# Patient Record
Sex: MALE | Race: WHITE | HISPANIC OR LATINO | URBAN - METROPOLITAN AREA
[De-identification: names, ages, dates, MRNs, and addresses within clinical notes are randomized per-mention and may not be internally consistent; named-entity substitution may affect disease eponyms.]

---

## 2017-03-08 PROBLEM — Z00.00 ENCOUNTER FOR PREVENTIVE HEALTH EXAMINATION: Status: ACTIVE | Noted: 2017-03-08

## 2021-05-08 ENCOUNTER — EMERGENCY (EMERGENCY)
Facility: HOSPITAL | Age: 61
LOS: 1 days | Discharge: HOME | End: 2021-05-08
Attending: EMERGENCY MEDICINE
Payer: COMMERCIAL

## 2021-05-08 VITALS
OXYGEN SATURATION: 97 % | HEART RATE: 66 BPM | RESPIRATION RATE: 18 BRPM | SYSTOLIC BLOOD PRESSURE: 140 MMHG | TEMPERATURE: 98 F | DIASTOLIC BLOOD PRESSURE: 80 MMHG

## 2021-05-08 VITALS
SYSTOLIC BLOOD PRESSURE: 152 MMHG | TEMPERATURE: 98 F | OXYGEN SATURATION: 96 % | WEIGHT: 154.98 LBS | RESPIRATION RATE: 18 BRPM | HEART RATE: 74 BPM | HEIGHT: 66 IN | DIASTOLIC BLOOD PRESSURE: 90 MMHG

## 2021-05-08 LAB
ALBUMIN SERPL ELPH-MCNC: 3.9 G/DL — SIGNIFICANT CHANGE UP (ref 3.3–5.2)
ALP SERPL-CCNC: 59 U/L — SIGNIFICANT CHANGE UP (ref 40–120)
ALT FLD-CCNC: 19 U/L — SIGNIFICANT CHANGE UP
ANION GAP SERPL CALC-SCNC: 12 MMOL/L — SIGNIFICANT CHANGE UP (ref 5–17)
AST SERPL-CCNC: 26 U/L — SIGNIFICANT CHANGE UP
BASOPHILS # BLD AUTO: 0.07 K/UL — SIGNIFICANT CHANGE UP (ref 0–0.2)
BASOPHILS NFR BLD AUTO: 1.1 % — SIGNIFICANT CHANGE UP (ref 0–2)
BILIRUB SERPL-MCNC: 0.3 MG/DL — LOW (ref 0.4–2)
BUN SERPL-MCNC: 16 MG/DL — SIGNIFICANT CHANGE UP (ref 8–20)
CALCIUM SERPL-MCNC: 8.9 MG/DL — SIGNIFICANT CHANGE UP (ref 8.6–10.2)
CHLORIDE SERPL-SCNC: 105 MMOL/L — SIGNIFICANT CHANGE UP (ref 98–107)
CO2 SERPL-SCNC: 23 MMOL/L — SIGNIFICANT CHANGE UP (ref 22–29)
CREAT SERPL-MCNC: 0.86 MG/DL — SIGNIFICANT CHANGE UP (ref 0.5–1.3)
EOSINOPHIL # BLD AUTO: 0.26 K/UL — SIGNIFICANT CHANGE UP (ref 0–0.5)
EOSINOPHIL NFR BLD AUTO: 4 % — SIGNIFICANT CHANGE UP (ref 0–6)
GLUCOSE SERPL-MCNC: 97 MG/DL — SIGNIFICANT CHANGE UP (ref 70–99)
HCT VFR BLD CALC: 38.3 % — LOW (ref 39–50)
HGB BLD-MCNC: 12 G/DL — LOW (ref 13–17)
IMM GRANULOCYTES NFR BLD AUTO: 1.1 % — SIGNIFICANT CHANGE UP (ref 0–1.5)
LYMPHOCYTES # BLD AUTO: 1.07 K/UL — SIGNIFICANT CHANGE UP (ref 1–3.3)
LYMPHOCYTES # BLD AUTO: 16.3 % — SIGNIFICANT CHANGE UP (ref 13–44)
MCHC RBC-ENTMCNC: 27 PG — SIGNIFICANT CHANGE UP (ref 27–34)
MCHC RBC-ENTMCNC: 31.3 GM/DL — LOW (ref 32–36)
MCV RBC AUTO: 86.1 FL — SIGNIFICANT CHANGE UP (ref 80–100)
MONOCYTES # BLD AUTO: 0.58 K/UL — SIGNIFICANT CHANGE UP (ref 0–0.9)
MONOCYTES NFR BLD AUTO: 8.9 % — SIGNIFICANT CHANGE UP (ref 2–14)
NEUTROPHILS # BLD AUTO: 4.5 K/UL — SIGNIFICANT CHANGE UP (ref 1.8–7.4)
NEUTROPHILS NFR BLD AUTO: 68.6 % — SIGNIFICANT CHANGE UP (ref 43–77)
PLATELET # BLD AUTO: 210 K/UL — SIGNIFICANT CHANGE UP (ref 150–400)
POTASSIUM SERPL-MCNC: 4 MMOL/L — SIGNIFICANT CHANGE UP (ref 3.5–5.3)
POTASSIUM SERPL-SCNC: 4 MMOL/L — SIGNIFICANT CHANGE UP (ref 3.5–5.3)
PROT SERPL-MCNC: 6.3 G/DL — LOW (ref 6.6–8.7)
RBC # BLD: 4.45 M/UL — SIGNIFICANT CHANGE UP (ref 4.2–5.8)
RBC # FLD: 11.8 % — SIGNIFICANT CHANGE UP (ref 10.3–14.5)
SODIUM SERPL-SCNC: 140 MMOL/L — SIGNIFICANT CHANGE UP (ref 135–145)
WBC # BLD: 6.55 K/UL — SIGNIFICANT CHANGE UP (ref 3.8–10.5)
WBC # FLD AUTO: 6.55 K/UL — SIGNIFICANT CHANGE UP (ref 3.8–10.5)

## 2021-05-08 PROCEDURE — 70450 CT HEAD/BRAIN W/O DYE: CPT | Mod: 26

## 2021-05-08 PROCEDURE — 71260 CT THORAX DX C+: CPT | Mod: 26,MA

## 2021-05-08 PROCEDURE — 72125 CT NECK SPINE W/O DYE: CPT | Mod: 26

## 2021-05-08 PROCEDURE — 74177 CT ABD & PELVIS W/CONTRAST: CPT | Mod: 26,MA

## 2021-05-08 PROCEDURE — 72131 CT LUMBAR SPINE W/O DYE: CPT | Mod: 26,MA

## 2021-05-08 PROCEDURE — 99285 EMERGENCY DEPT VISIT HI MDM: CPT

## 2021-05-08 PROCEDURE — 72128 CT CHEST SPINE W/O DYE: CPT | Mod: 26,MA

## 2021-05-08 RX ORDER — ACETAMINOPHEN 500 MG
1000 TABLET ORAL ONCE
Refills: 0 | Status: COMPLETED | OUTPATIENT
Start: 2021-05-08 | End: 2021-05-08

## 2021-05-08 RX ORDER — KETOROLAC TROMETHAMINE 30 MG/ML
15 SYRINGE (ML) INJECTION ONCE
Refills: 0 | Status: DISCONTINUED | OUTPATIENT
Start: 2021-05-08 | End: 2021-05-08

## 2021-05-08 RX ORDER — METOCLOPRAMIDE HCL 10 MG
10 TABLET ORAL ONCE
Refills: 0 | Status: COMPLETED | OUTPATIENT
Start: 2021-05-08 | End: 2021-05-08

## 2021-05-08 RX ORDER — MECLIZINE HCL 12.5 MG
25 TABLET ORAL ONCE
Refills: 0 | Status: COMPLETED | OUTPATIENT
Start: 2021-05-08 | End: 2021-05-08

## 2021-05-08 RX ORDER — SODIUM CHLORIDE 9 MG/ML
1000 INJECTION, SOLUTION INTRAVENOUS ONCE
Refills: 0 | Status: COMPLETED | OUTPATIENT
Start: 2021-05-08 | End: 2021-05-08

## 2021-05-08 RX ADMIN — Medication 400 MILLIGRAM(S): at 21:26

## 2021-05-08 RX ADMIN — Medication 25 MILLIGRAM(S): at 23:15

## 2021-05-08 RX ADMIN — SODIUM CHLORIDE 1000 MILLILITER(S): 9 INJECTION, SOLUTION INTRAVENOUS at 20:42

## 2021-05-08 RX ADMIN — SODIUM CHLORIDE 1000 MILLILITER(S): 9 INJECTION, SOLUTION INTRAVENOUS at 21:42

## 2021-05-08 RX ADMIN — Medication 10 MILLIGRAM(S): at 20:42

## 2021-05-08 RX ADMIN — Medication 1000 MILLIGRAM(S): at 21:41

## 2021-05-08 RX ADMIN — Medication 1000 MILLIGRAM(S): at 23:15

## 2021-05-08 NOTE — ED ADULT TRIAGE NOTE - CHIEF COMPLAINT QUOTE
pt BIBA s.p fall from the attic, reports he hit his head. hematoma present on exam, + loc. no use of thinners, no neck pain, AOX3 with GCS 15. even and unlabored resps present.

## 2021-05-08 NOTE — ED PROVIDER NOTE - OBJECTIVE STATEMENT
60M healthy w/ mechanical ladder fall from 10ft landing first on his lower back and hitting posterior head, with brief LOC.  Currently complains of back soreness and head/neck pain.  Otherwise denies bleeding, SOB, chest pain, abdominal pain or fevers.  Denies other pertinent medical problems.  Denies any overt substance use.

## 2021-05-08 NOTE — ED PROVIDER NOTE - CARE PROVIDER_API CALL
Ariadne Verdugo (DO)  Brain Injury Medicine; PhysicalRehab Medicine  [address] [place] [address]  Phone: [phone]  Fax: [phone]  Follow Up Time:

## 2021-05-08 NOTE — ED PROVIDER NOTE - PATIENT PORTAL LINK FT
You can access the FollowMyHealth Patient Portal offered by [facility] by registering at the following website: [email or web address]. By joining [employer]’s FollowMyHealth portal, you will also be able to view your health information using other applications (apps) compatible with our system.

## 2021-05-08 NOTE — ED PROVIDER NOTE - CLINICAL SUMMARY MEDICAL DECISION MAKING FREE TEXT BOX
60M healthy w/ mechanical ladder fall from 10ft landing first on his lower back and hitting posterior head, with brief LOC.  IVF and pain meds given. Full trauma exam shows no obvious deformity.  Labs, CT head, spine, chest, abdomen, and pelvis ordered.

## 2021-05-08 NOTE — ED ADULT NURSE NOTE - OBJECTIVE STATEMENT
Pt presents to ED s/p fall from attic stairs. Pt states that he was working in his garage attic when he was on his way down the ladder slid out from under him, causing him to fall and land on his buttocks and hit the rear of his head. Pt states that he did pass out "for a little while" and that when he woke up he could not talk to his wife. Pt denies anticoag medications, blurry vision, double vision, thunderclap HA but does endorse dizziness with motion.

## 2021-05-08 NOTE — ED PROVIDER NOTE - CARE PLAN
Principal Discharge DX:	Concussion  Secondary Diagnosis:	Head contusion  Secondary Diagnosis:	Contusion of back

## 2021-05-08 NOTE — ED PROVIDER NOTE - PHYSICAL EXAMINATION
General: NAD, calm  HEENT: Normocephalic, EOM intact,  PEERLA 4mm pupils.  3 mm scalp lac not bleeding anymore  Neck: No midline tenderness or JVD, ccollar in place  Pulm: Chest wall symmetric and nontender, lungs clear to ascultation   Cardiac: Regular rate and regular rhythm  Abdomen: Nontender and nondistended  Skin: Skin in warm, dry and intact without rashes or lesions.  Neuro: No apparent motor or sensory deficits, CN intact, no incontinence  Psych: Alert, oriented, and cooperative  MSK: no limb deformities. Nontender spine, stable pelvis

## 2021-05-08 NOTE — ED PROVIDER NOTE - NS ED ROS FT
General: No fever, no massive bleeding  Head: + HA, no ongoing scalp bleed  ENT: +neck pain, no sore throat  Resp: No SOB, no hemoptysis  Cardiovascular: No CP, + LOC  GI: No abdominal pain, No blood in stool  : No dysuria, no hematuria   MSK: + back pain, no limb pain  Skin: No painful or bleeding lesions  Neuro: No paresthesias, No focal deficits

## 2021-05-08 NOTE — ED ADULT NURSE NOTE - NSIMPLEMENTINTERV_GEN_ALL_ED
Implemented All Fall Risk Interventions:  [place] to call system. Call bell, personal items and telephone within reach. Instruct patient to call for assistance. Room bathroom lighting operational. Non-slip footwear when patient is off stretcher. Physically safe environment: no spills, clutter or unnecessary equipment. Stretcher in lowest position, wheels locked, appropriate side rails in place. Provide visual cue, wrist band, yellow gown, etc. Monitor gait and stability. Monitor for mental status changes and reorient to person, place, and time. Review medications for side effects contributing to fall risk. Reinforce activity limits and safety measures with patient and family.

## 2021-05-08 NOTE — ED PROVIDER NOTE - NSFOLLOWUPINSTRUCTIONS_ED_ALL_ED_FT
Follow up with concussion medicine, see provided information  Continue ibuprofen 600mg every 6 hours and/or acetaminophen 1000mg every 6 hours as needed for pain  You may take meclizine 25mg every 6-8 hours as needed for dizziness  Return to the ER with any new, worsening or persistent symptoms.

## 2021-05-09 PROCEDURE — 96365 THER/PROPH/DIAG IV INF INIT: CPT | Mod: XU

## 2021-05-09 PROCEDURE — 96375 TX/PRO/DX INJ NEW DRUG ADDON: CPT | Mod: XU

## 2021-05-09 PROCEDURE — 36415 COLL VENOUS BLD VENIPUNCTURE: CPT

## 2021-05-09 PROCEDURE — 71260 CT THORAX DX C+: CPT

## 2021-05-09 PROCEDURE — 99284 EMERGENCY DEPT VISIT MOD MDM: CPT | Mod: 25

## 2021-05-09 PROCEDURE — 70450 CT HEAD/BRAIN W/O DYE: CPT

## 2021-05-09 PROCEDURE — 72125 CT NECK SPINE W/O DYE: CPT

## 2021-05-09 PROCEDURE — 80053 COMPREHEN METABOLIC PANEL: CPT

## 2021-05-09 PROCEDURE — 96361 HYDRATE IV INFUSION ADD-ON: CPT

## 2021-05-09 PROCEDURE — 85025 COMPLETE CBC W/AUTO DIFF WBC: CPT

## 2021-05-09 PROCEDURE — 74177 CT ABD & PELVIS W/CONTRAST: CPT

## 2021-05-09 RX ORDER — MECLIZINE HCL 12.5 MG
1 TABLET ORAL
Qty: 15 | Refills: 0
Start: 2021-05-09 | End: 2021-05-13

## 2021-05-09 RX ADMIN — Medication 15 MILLIGRAM(S): at 00:09

## 2021-05-09 RX ADMIN — SODIUM CHLORIDE 1000 MILLILITER(S): 9 INJECTION, SOLUTION INTRAVENOUS at 00:09

## 2021-05-18 ENCOUNTER — APPOINTMENT (OUTPATIENT)
Dept: PHYSICAL MEDICINE AND REHAB | Facility: CLINIC | Age: 61
End: 2021-05-18
Payer: COMMERCIAL

## 2021-05-18 VITALS
TEMPERATURE: 97.1 F | DIASTOLIC BLOOD PRESSURE: 80 MMHG | BODY MASS INDEX: 23.54 KG/M2 | SYSTOLIC BLOOD PRESSURE: 125 MMHG | HEIGHT: 67 IN | WEIGHT: 150 LBS | HEART RATE: 88 BPM

## 2021-05-18 DIAGNOSIS — Z78.9 OTHER SPECIFIED HEALTH STATUS: ICD-10-CM

## 2021-05-18 DIAGNOSIS — S09.90XA UNSPECIFIED INJURY OF HEAD, INITIAL ENCOUNTER: ICD-10-CM

## 2021-05-18 DIAGNOSIS — R26.89 OTHER ABNORMALITIES OF GAIT AND MOBILITY: ICD-10-CM

## 2021-05-18 PROCEDURE — 99203 OFFICE O/P NEW LOW 30 MIN: CPT

## 2021-05-18 PROCEDURE — 99072 ADDL SUPL MATRL&STAF TM PHE: CPT

## 2021-05-18 RX ORDER — FLUTICASONE PROPIONATE 50 UG/1
50 SPRAY, METERED NASAL
Refills: 0 | Status: ACTIVE | COMMUNITY

## 2021-05-18 RX ORDER — OMEPRAZOLE 40 MG/1
40 CAPSULE, DELAYED RELEASE ORAL
Refills: 0 | Status: ACTIVE | COMMUNITY

## 2022-03-03 NOTE — ED ADULT NURSE NOTE - NS ED NURSE LEVEL OF CONSCIOUSNESS SPEECH
Speaking Coherently Clofazimine Counseling:  I discussed with the patient the risks of clofazimine including but not limited to skin and eye pigmentation, liver damage, nausea/vomiting, gastrointestinal bleeding and allergy.

## 2023-02-25 ENCOUNTER — OFFICE (OUTPATIENT)
Dept: URBAN - METROPOLITAN AREA CLINIC 94 | Facility: CLINIC | Age: 63
Setting detail: OPHTHALMOLOGY
End: 2023-02-25
Payer: COMMERCIAL

## 2023-02-25 DIAGNOSIS — H33.311: ICD-10-CM

## 2023-02-25 DIAGNOSIS — H35.372: ICD-10-CM

## 2023-02-25 DIAGNOSIS — H35.3131: ICD-10-CM

## 2023-02-25 DIAGNOSIS — H43.811: ICD-10-CM

## 2023-02-25 PROCEDURE — 92014 COMPRE OPH EXAM EST PT 1/>: CPT | Performed by: SPECIALIST

## 2023-02-25 PROCEDURE — 92235 FLUORESCEIN ANGRPH MLTIFRAME: CPT | Performed by: SPECIALIST

## 2023-02-25 PROCEDURE — 92134 CPTRZ OPH DX IMG PST SGM RTA: CPT | Performed by: SPECIALIST

## 2023-02-25 ASSESSMENT — KERATOMETRY
OD_K1POWER_DIOPTERS: 42.75
OD_AXISANGLE_DEGREES: 090
OS_K1POWER_DIOPTERS: 42.25
OS_AXISANGLE_DEGREES: 080
OD_K2POWER_DIOPTERS: 42.75
OS_K2POWER_DIOPTERS: 42.50

## 2023-02-25 ASSESSMENT — REFRACTION_AUTOREFRACTION
OS_CYLINDER: -0.75
OD_CYLINDER: -1.00
OD_SPHERE: +1.25
OD_AXIS: 075
OS_AXIS: 108
OS_SPHERE: +1.00

## 2023-02-25 ASSESSMENT — SPHEQUIV_DERIVED
OD_SPHEQUIV: 0.75
OS_SPHEQUIV: 0.625

## 2023-02-25 ASSESSMENT — AXIALLENGTH_DERIVED
OS_AL: 23.7629
OD_AL: 23.5752

## 2023-02-25 ASSESSMENT — VISUAL ACUITY
OD_BCVA: 20/20
OS_BCVA: 20/20-1

## 2023-02-25 ASSESSMENT — CONFRONTATIONAL VISUAL FIELD TEST (CVF)
OS_FINDINGS: FULL
OD_FINDINGS: FULL

## 2023-10-07 ENCOUNTER — OFFICE (OUTPATIENT)
Dept: URBAN - METROPOLITAN AREA CLINIC 94 | Facility: CLINIC | Age: 63
Setting detail: OPHTHALMOLOGY
End: 2023-10-07
Payer: COMMERCIAL

## 2023-10-07 DIAGNOSIS — H35.372: ICD-10-CM

## 2023-10-07 DIAGNOSIS — H33.311: ICD-10-CM

## 2023-10-07 DIAGNOSIS — H35.3131: ICD-10-CM

## 2023-10-07 DIAGNOSIS — H43.811: ICD-10-CM

## 2023-10-07 PROCEDURE — 92012 INTRM OPH EXAM EST PATIENT: CPT | Performed by: SPECIALIST

## 2023-10-07 PROCEDURE — 92134 CPTRZ OPH DX IMG PST SGM RTA: CPT | Performed by: SPECIALIST

## 2023-10-07 ASSESSMENT — REFRACTION_AUTOREFRACTION
OD_CYLINDER: -1.00
OS_SPHERE: +1.00
OS_AXIS: 108
OD_AXIS: 075
OS_CYLINDER: -0.75
OD_SPHERE: +1.25

## 2023-10-07 ASSESSMENT — TONOMETRY
OD_IOP_MMHG: 18
OS_IOP_MMHG: 21

## 2023-10-07 ASSESSMENT — AXIALLENGTH_DERIVED
OS_AL: 23.7629
OD_AL: 23.5752

## 2023-10-07 ASSESSMENT — KERATOMETRY
OD_AXISANGLE_DEGREES: 090
OS_K2POWER_DIOPTERS: 42.50
OD_K1POWER_DIOPTERS: 42.75
OS_AXISANGLE_DEGREES: 080
OD_K2POWER_DIOPTERS: 42.75
OS_K1POWER_DIOPTERS: 42.25

## 2023-10-07 ASSESSMENT — SPHEQUIV_DERIVED
OD_SPHEQUIV: 0.75
OS_SPHEQUIV: 0.625

## 2023-10-07 ASSESSMENT — CONFRONTATIONAL VISUAL FIELD TEST (CVF)
OS_FINDINGS: FULL
OD_FINDINGS: FULL

## 2023-10-07 ASSESSMENT — VISUAL ACUITY
OS_BCVA: 20/25
OD_BCVA: 20/20

## 2024-04-02 ENCOUNTER — OFFICE (OUTPATIENT)
Dept: URBAN - METROPOLITAN AREA CLINIC 12 | Facility: CLINIC | Age: 64
Setting detail: OPHTHALMOLOGY
End: 2024-04-02
Payer: COMMERCIAL

## 2024-04-02 VITALS — HEIGHT: 60 IN

## 2024-04-02 DIAGNOSIS — H52.7: ICD-10-CM

## 2024-04-02 DIAGNOSIS — H16.223: ICD-10-CM

## 2024-04-02 PROCEDURE — 92015 DETERMINE REFRACTIVE STATE: CPT | Performed by: OPTOMETRIST

## 2024-04-02 PROCEDURE — 99213 OFFICE O/P EST LOW 20 MIN: CPT | Performed by: OPTOMETRIST

## 2024-04-06 ENCOUNTER — OFFICE (OUTPATIENT)
Dept: URBAN - METROPOLITAN AREA CLINIC 94 | Facility: CLINIC | Age: 64
Setting detail: OPHTHALMOLOGY
End: 2024-04-06
Payer: COMMERCIAL

## 2024-04-06 DIAGNOSIS — H35.372: ICD-10-CM

## 2024-04-06 DIAGNOSIS — H35.3131: ICD-10-CM

## 2024-04-06 DIAGNOSIS — H43.811: ICD-10-CM

## 2024-04-06 DIAGNOSIS — H33.311: ICD-10-CM

## 2024-04-06 PROBLEM — H16.223 DRY EYE SYNDROME K SICCA; BOTH EYES: Status: ACTIVE | Noted: 2024-04-02

## 2024-04-06 PROCEDURE — 92014 COMPRE OPH EXAM EST PT 1/>: CPT | Performed by: SPECIALIST

## 2024-04-06 PROCEDURE — 92134 CPTRZ OPH DX IMG PST SGM RTA: CPT | Performed by: SPECIALIST

## 2024-10-05 ENCOUNTER — OFFICE (OUTPATIENT)
Dept: URBAN - METROPOLITAN AREA CLINIC 94 | Facility: CLINIC | Age: 64
Setting detail: OPHTHALMOLOGY
End: 2024-10-05
Payer: COMMERCIAL

## 2024-10-05 DIAGNOSIS — H43.811: ICD-10-CM

## 2024-10-05 DIAGNOSIS — H35.372: ICD-10-CM

## 2024-10-05 DIAGNOSIS — H35.3131: ICD-10-CM

## 2024-10-05 DIAGNOSIS — H33.311: ICD-10-CM

## 2024-10-05 PROCEDURE — 92235 FLUORESCEIN ANGRPH MLTIFRAME: CPT | Performed by: SPECIALIST

## 2024-10-05 PROCEDURE — 92014 COMPRE OPH EXAM EST PT 1/>: CPT | Performed by: SPECIALIST

## 2024-10-05 PROCEDURE — 92134 CPTRZ OPH DX IMG PST SGM RTA: CPT | Performed by: SPECIALIST

## 2024-10-05 ASSESSMENT — KERATOMETRY
OS_K1POWER_DIOPTERS: 42.25
OD_K1POWER_DIOPTERS: 42.50
OD_AXISANGLE_DEGREES: 090
OS_AXISANGLE_DEGREES: 090
OS_K2POWER_DIOPTERS: 42.25
OD_K2POWER_DIOPTERS: 42.50

## 2024-10-05 ASSESSMENT — SUPERFICIAL PUNCTATE KERATITIS (SPK)
OS_SPK: T
OD_SPK: T

## 2024-10-05 ASSESSMENT — REFRACTION_AUTOREFRACTION
OD_SPHERE: +1.75
OS_AXIS: 101
OS_CYLINDER: -1.00
OS_SPHERE: +1.50
OD_CYLINDER: -1.00
OD_AXIS: 088

## 2024-10-05 ASSESSMENT — TONOMETRY
OS_IOP_MMHG: 18
OD_IOP_MMHG: 14

## 2024-10-05 ASSESSMENT — CONFRONTATIONAL VISUAL FIELD TEST (CVF)
OD_FINDINGS: FULL
OS_FINDINGS: FULL

## 2024-10-05 ASSESSMENT — VISUAL ACUITY
OS_BCVA: 20/40
OD_BCVA: 20/20

## 2025-06-07 ENCOUNTER — OFFICE (OUTPATIENT)
Dept: URBAN - METROPOLITAN AREA CLINIC 94 | Facility: CLINIC | Age: 65
Setting detail: OPHTHALMOLOGY
End: 2025-06-07
Payer: COMMERCIAL

## 2025-06-07 DIAGNOSIS — H35.3131: ICD-10-CM

## 2025-06-07 DIAGNOSIS — H43.811: ICD-10-CM

## 2025-06-07 DIAGNOSIS — H33.311: ICD-10-CM

## 2025-06-07 DIAGNOSIS — H35.372: ICD-10-CM

## 2025-06-07 PROCEDURE — 92134 CPTRZ OPH DX IMG PST SGM RTA: CPT | Performed by: OPHTHALMOLOGY

## 2025-06-07 PROCEDURE — 92235 FLUORESCEIN ANGRPH MLTIFRAME: CPT | Performed by: OPHTHALMOLOGY

## 2025-06-07 PROCEDURE — 92014 COMPRE OPH EXAM EST PT 1/>: CPT | Performed by: OPHTHALMOLOGY

## 2025-06-07 ASSESSMENT — TONOMETRY
OD_IOP_MMHG: 17
OS_IOP_MMHG: 17

## 2025-06-07 ASSESSMENT — REFRACTION_AUTOREFRACTION
OD_CYLINDER: -1.00
OS_AXIS: 101
OS_CYLINDER: -1.00
OD_AXIS: 088
OD_SPHERE: +1.75
OS_SPHERE: +1.50

## 2025-06-07 ASSESSMENT — KERATOMETRY
OS_K2POWER_DIOPTERS: 42.25
OS_AXISANGLE_DEGREES: 090
OS_K1POWER_DIOPTERS: 42.25
OD_K2POWER_DIOPTERS: 42.50
OD_AXISANGLE_DEGREES: 090
OD_K1POWER_DIOPTERS: 42.50

## 2025-06-07 ASSESSMENT — SUPERFICIAL PUNCTATE KERATITIS (SPK)
OS_SPK: T
OD_SPK: T

## 2025-06-07 ASSESSMENT — VISUAL ACUITY
OD_BCVA: 20/25+1
OS_BCVA: 20/25

## 2025-06-07 ASSESSMENT — CONFRONTATIONAL VISUAL FIELD TEST (CVF)
OS_FINDINGS: FULL
OD_FINDINGS: FULL

## 2025-07-18 NOTE — ED PROVIDER NOTE - ATTENDING CONTRIBUTION TO CARE
PROGRESS NOTE    Subjective     Edu is a 65 year old here for Office Visit and Physical     Nurse's note reviewed and accepted.    Lives with:           Do you feel safe(yes,no): yes     Number of children:     boys     girls   none     Last seen by:  Dr Penny  Around: 1 yr ago     Exercise(specify what you do in a week):    Stretch , walk. Push ups,  leg lifts  Diet: keto, some fruit and veggies   Do you eat 3 meals in a day?:  yes  What do you typically have for-(see below)  Breakfast:  fruit n egg or cottage cheese  Lunch: soup , sandwich   Supper:  chicken, beef. Salad  Normally drinks (ex: water, soda, coffee):   Water, ice teas, diet soda  Amount of water for one day: 1 gallon      Screening:   Personal history of MI/Stroke/heart disease:    Personal history of cancer: none  Last colonoscopy:  overdue  Is dental care up to date:  yes  Is ophthalmology up to date:  yes  Are you taking a vit d supplement: yes  Are you taking a calcium supplement: yes  Any tobacco or drug use: none  What is your alcohol consumption:  1 to 2 drinks 3x week    Doing well with mounjaro. 5 mg now. NO side effects. Appetite not much different. Better than trulicity, more food noise with that. Still taking metformin. He is checking his BG after dinner 120s, before dinner 90s. He does not check AM. Still doing Virta.     Sprained his R wrist. But better, from working in the garden. He wants to monitor for now.     Review of Systems       SDOH Never Smoker          Objective   Vitals:    07/18/25 1057   BP: 132/84   Pulse: 80   SpO2: 97%   Weight: 127.5 kg (281 lb)   Height: 6' (1.829 m)   BMI (Calculated): 38.11     Physical Exam  General Appearance:  Alert, cooperative, no distress, appears stated age.  Head:  Normocephalic, without obvious abnormality, atraumatic.   Eyes:  EOMI.  Sclerae normal. PERRL.   Ears:  Hearing normal to spoken voice.  Tympanic membranes and ear canals normal.  Nose: normal mucosa, no lesions or masses, no  drainage  Throat:  No oropharyngeal erythema, lesions or exudates, tonsils not enlarged.  Neck:  Supple, symmetrical, trachea midline, no adenopathy.  Thyroid has no enlargement/tenderness/nodules.  Back:  Symmetric, no curvature, range of motion normal, no costovertebral angle tenderness.  Lungs:  Clear to auscultation bilaterally, respirations unlabored.  Heart:  Regular rate and rhythm, S1 and S2 normal, no murmur, rub or gallop.  Abdomen:  Soft, non distended, non-tender, bowel sounds active all four quadrants, no masses, no organomegaly.  Extremities:  Atraumatic, no cyanosis or edema. Strength symmetric and sensory intact in all 4 extremities.   Pulses:  2+ and symmetric all extremities.  Skin:  Skin color, texture, turgor normal, no rashes or lesions.  Lymph Nodes:  Cervical, supraclavicular nodes normal.  Neurologic: CN 2-12 are intact.  No nystagmus.  No focal deficits.  Normal  gait.   Psychiatric:  Cooperative.  Appropriate mood and affect.  Normal judgment.  Normal social interaction.      Diabetic Foot Exam Documentation   Normal Bilateral Foot Exam:  Skin integrity is normal. Dorsalis pedis and posterior tibial pulses are present.  Pressure sensation using the [place]-Mateusz monofilament is present.        ASSESSMENT AND PLAN        - medical history reviewed in detail and chart updated  - health maintenance addressed  - healthy lifestyle choices discussed  - most recent lab results were discussed with patient if applicable  - physical in 1 year, follow-up in 6 month     Diabetes mellitus.  - The current medication regimen includes metformin and now Mounjaro 5 mg daily.  We discontinued Trulicity on his initial visit as he noted increased appetite and we switched to Mounjaro.  He was also on Januvia previously   off Januvia 50 mg daily and Metformin 1500 mg in the morning and 1000 mg in the evening.   - He has been able to decrease weight, his A1c is reasonably controlled.  - Reportedly normal  ophthalmology exams.  No neuropathy or nephropathy.  -I will see patient again in 6 months for follow-up with labs     Dyslipidemia: Declines statins.  We reviewed the recommendation for taking statins given diabetes and his ASCVD.  He wanted to repeat his labs again in 6 months and perhaps decide later at that point  The 10-year ASCVD risk score (Alejo RM, et al., 2019) is: 28.4%    Values used to calculate the score:      Age: 65 years      Sex: Male      Is Non- : No      Diabetic: Yes      Tobacco smoker: No      Systolic Blood Pressure: 132 mmHg      Is BP treated: Yes      HDL Cholesterol: 36 mg/dL      Total Cholesterol: 158 mg/dL       Sleep apnea.  - The patient has not used CPAP for a couple of years due to significant weight loss and reduced apneic episodes.  - If there are no symptoms of snoring or interrupted sleep, discontinuation of CPAP is reasonable.  - However, untreated sleep apnea could contribute to weight gain, so monitoring for symptoms is advised.     Hypertension.  - Blood pressure is well-controlled with lisinopril, typically in the mid-120s/80s range.  - Continue current medication and monitoring.     Gout.  - Currently managed with allopurinol, with no recent flares.  - Uric acid levels at goal     History of pulmonary embolism.  - History of recurrent pulmonary embolism, currently managed with Xarelto.  - No recent issues reported.       1. Annual physical exam  2. Type 2 diabetes mellitus without complication, without long-term current use of insulin (CMD)  -     ALBUMIN, RANDOM URINE WITH CREATININE; Future  -     Basic Metabolic Panel; Future  -     Glycohemoglobin; Future  3. Dyslipidemia  -     Lipid Panel With Reflex; Future  4. Primary hypertension  5. History of pulmonary embolism            Fall from 8-10 feet off ladder, no LOC, has headache and dizziness consistent with post concussive syndrome. CT imaging without any acute traumatic injuries. Symptoms controlled with ED treatment. Follow up with concussion clinic. Return precautions discussed.